# Patient Record
Sex: FEMALE | Race: WHITE | NOT HISPANIC OR LATINO | URBAN - METROPOLITAN AREA
[De-identification: names, ages, dates, MRNs, and addresses within clinical notes are randomized per-mention and may not be internally consistent; named-entity substitution may affect disease eponyms.]

---

## 2017-06-01 ENCOUNTER — EMERGENCY (EMERGENCY)
Facility: HOSPITAL | Age: 57
LOS: 0 days | Discharge: HOME | End: 2017-06-01
Attending: EMERGENCY MEDICINE | Admitting: EMERGENCY MEDICINE
Payer: COMMERCIAL

## 2017-06-01 VITALS
RESPIRATION RATE: 16 BRPM | SYSTOLIC BLOOD PRESSURE: 151 MMHG | DIASTOLIC BLOOD PRESSURE: 78 MMHG | TEMPERATURE: 99 F | HEART RATE: 65 BPM | OXYGEN SATURATION: 100 %

## 2017-06-01 VITALS — WEIGHT: 130.07 LBS | HEIGHT: 61 IN
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
## 2017-06-01 DIAGNOSIS — S09.90XA UNSPECIFIED INJURY OF HEAD, INITIAL ENCOUNTER: ICD-10-CM

## 2017-06-01 PROCEDURE — 70450 CT HEAD/BRAIN W/O DYE: CPT | Mod: 26

## 2017-06-01 PROCEDURE — 99284 EMERGENCY DEPT VISIT MOD MDM: CPT

## 2017-06-01 PROCEDURE — 72125 CT NECK SPINE W/O DYE: CPT | Mod: 26

## 2017-06-01 RX ORDER — ACETAMINOPHEN 500 MG
1000 TABLET ORAL ONCE
Qty: 0 | Refills: 0 | Status: COMPLETED | OUTPATIENT
Start: 2017-06-01 | End: 2017-06-01

## 2017-06-01 RX ADMIN — Medication 1000 MILLIGRAM(S): at 03:04

## 2017-06-01 NOTE — ED ADULT NURSE NOTE - OBJECTIVE STATEMENT
Pt presents to ED s/p slip and fall in the kitchen - pt states she hit her head on the corner of the counter. Neg LOC. Pt c/o mild pain 1/10 in her neck and right parietal side of her head. No abrasions or lacerations noted. Pt denies visual disturbances. Pt is alert and oriented x 4. States she takes a baby aspirin every day but did not take one today.

## 2017-06-01 NOTE — ED PROVIDER NOTE - NS ED MD SCRIBE ATTENDING SCRIBE SECTIONS
VITAL SIGNS( Pullset)/HISTORY OF PRESENT ILLNESS/PHYSICAL EXAM/REVIEW OF SYSTEMS/RESULTS/DISPOSITION/PAST MEDICAL/SURGICAL/SOCIAL HISTORY/PROGRESS NOTE

## 2017-06-01 NOTE — ED ADULT NURSE NOTE - CHIEF COMPLAINT QUOTE
pt presents to ED s/p head injury. pt states she slipped on water and struck head on wall. neg LOC. denies any dizziness, chest pain, sob, n/v/d.

## 2017-06-01 NOTE — ED PROVIDER NOTE - DETAILS:
Cinthia Aggarwal MD - The scribe's documentation has been prepared under my direction and personally reviewed by me in its entirety. I confirm that the note above accurately reflects all work, treatment, procedures, and medical decision making performed by me.

## 2017-06-01 NOTE — ED PROVIDER NOTE - OBJECTIVE STATEMENT
57 y/o female with PMHx of HLD, on Statin and baby ASA presents to the ED s/p fall. Pt states she slipped on water in her kitchen and hit the left side of her head against the wall. C/o left sided neck pain and HA. Denies LOC and any other pain. Came to ED because she was afraid she would die in her sleep.

## 2017-06-01 NOTE — ED ADULT TRIAGE NOTE - CHIEF COMPLAINT QUOTE
pt presents to ED s/p head injury. pt states she slipped on water and struck head on wall. neg LOC. denies any dizziness, chest pain, sob, n/v/d. pt presents to ED s/p fall and head injury. pt states she slipped on water and struck head on wall. neg LOC. denies any dizziness, chest pain, sob, n/v/d.

## 2021-07-01 NOTE — ED ADULT NURSE NOTE - CAS EDN DISCHARGE ASSESSMENT
PATIENT: Vandana Ramírez   DATE OF SERVICE: 2021   : 1969 MRN: [id number]       Chief Complaint   Patient presents with   • Injury     patient had an head injury on  at work. Patient was hit in the head with a piece of wood on the right temple. Patient was evaulated by the PCP provided by the employer-diagnosed wiht contusion. Patient endorsing tingiling sensation along her right temple and right fore head. Paitent states that she began to have blurry vision in her right eye about 2 days.        Vandana is a female presents to the [facility] for        patient had an head injury on  at work. Denies LOC. Patient was hit in the head with a piece of wood on the right temple. Patient was evaulated by the PCP provided by the employer-diagnosed wiht contusion. Patient endorsing tingiling sensation along her right temple and right fore head. Paitent states that she began to have blurry vision in her right eye about 2 days. Denies f/c/n/v, cp/sob, numbness, extremity weakness, confusion, dizziness        MEDICATIONS:  Current Outpatient Medications   Medication Sig   • losartan-hydrochlorothiazide (HYZAAR) 50-12.5 MG per tablet Take 1 tablet by mouth daily.   • Cyanocobalamin (VITAMIN B 12 PO)    • Nutritional Supplements (VITAMIN D MAINTENANCE PO)    • aspirin 81 MG tablet Take 81 mg by mouth daily.   • azithromycin (Zithromax Z-Yovani) 250 MG tablet 2 tablets day one, then 1 tablet days 2-5   • hydrochlorothiazide (HYDRODIURIL) 12.5 MG tablet      No current facility-administered medications for this visit.         ALLERGIES:  ALLERGIES:   Allergen Reactions   • Ace Inhibitors Angioedema   • Lisinopril HIVES       ACTIVE PROBLEM LIST:  Patient Active Problem List   Diagnosis   • Strep tonsillitis   • Essential hypertension   • Obesity (BMI 30-39.9)   • Acute URI         PAST MEDICAL HISTORY:  Past Medical History:   Diagnosis Date   • Essential (primary) hypertension    • No known problems        PAST  SURGICAL HISTORY:  Past Surgical History:   Procedure Laterality Date   • Hysterectomy     • Kidney surgery         FAMILY HISTORY:  Family History   Problem Relation Age of Onset   • Patient is unaware of any medical problems Mother    • Patient is unaware of any medical problems Father        SOCIAL HISTORY:  Social History     Tobacco Use   • Smoking status: Current Every Day Smoker   • Smokeless tobacco: Never Used   Substance Use Topics   • Alcohol use: Not on file   • Drug use: Not on file         Patient's medications, allergies, immunizations, past medical, surgical, and social history  were reviewed and updated as appropriate.      Review of Systems   Constitutional: Negative.    HENT: Negative.    Eyes: Positive for photophobia and visual disturbance.   Respiratory: Negative.    Cardiovascular: Negative.    Gastrointestinal: Negative.    Genitourinary: Negative.    Musculoskeletal: Negative.    Skin: Negative.    Neurological: Positive for headaches.   Psychiatric/Behavioral: Negative.      12 pt ROS performed. All systems reviewed and neg except HPI. Refer to HPI.      Objective   Visit Vitals  BP (!) 153/96 (BP Location: RUE - Right upper extremity, Patient Position: Sitting, Cuff Size: Regular)   Pulse 96   Temp 98.5 °F (36.9 °C) (Oral)   Resp 18   Wt 81.2 kg (179 lb)   SpO2 98%   BMI 28.89 kg/m²     Physical Exam  Vitals and nursing note reviewed.   Constitutional:       General: She is awake. She is not in acute distress.     Appearance: Normal appearance. She is not toxic-appearing or diaphoretic.   HENT:      Head: Normocephalic and atraumatic. No raccoon eyes, Jones's sign, abrasion, contusion, masses, right periorbital erythema, left periorbital erythema or laceration.      Jaw: There is normal jaw occlusion.      Right Ear: Tympanic membrane, ear canal and external ear normal.      Left Ear: Tympanic membrane, ear canal and external ear normal.      Nose: Nose normal.      Mouth/Throat:       Lips: Pink.      Mouth: Mucous membranes are moist.      Pharynx: Oropharynx is clear.   Eyes:      Extraocular Movements: Extraocular movements intact.      Conjunctiva/sclera: Conjunctivae normal.      Pupils: Pupils are equal, round, and reactive to light.   Cardiovascular:      Rate and Rhythm: Normal rate and regular rhythm.      Pulses: Normal pulses.      Heart sounds: Normal heart sounds.   Pulmonary:      Effort: Pulmonary effort is normal.      Breath sounds: Normal breath sounds and air entry.   Musculoskeletal:         General: Normal range of motion.      Cervical back: Normal range of motion and neck supple.   Skin:     General: Skin is warm and dry.      Capillary Refill: Capillary refill takes less than 2 seconds.      Coloration: Skin is not pale (pink).   Neurological:      General: No focal deficit present.      Mental Status: She is alert and oriented to person, place, and time.      Cranial Nerves: Cranial nerves are intact.      Motor: Motor function is intact. No weakness (BUE/BLE strength and b/l hand  5/5).      Coordination: Coordination is intact.      Gait: Gait is intact.   Psychiatric:         Behavior: Behavior normal. Behavior is cooperative.         Vitals:    07/01/21 1013 07/01/21 1018   BP: (!) 170/101 (!) 153/96   BP Location: LUE - Left upper extremity RUE - Right upper extremity   Patient Position: Sitting Sitting   Cuff Size: Regular Regular   Pulse: 96    Resp: 18    Temp: 98.5 °F (36.9 °C)    TempSrc: Oral    SpO2: 98%    Weight: 81.2 kg (179 lb)          Assessment   Problem List Items Addressed This Visit     None      Visit Diagnoses     Injury of head, initial encounter    -  Primary    Blurry vision        Tingling              Based on HPI complaints and exam findings pt sent to the ER for further evaluation. Pt states she agrees with plan and will go to [facility] ER immediately. Pt declined EMS. Pt will drive to the ER as per pt request, pt is stable w/ no acute  distress, vss.     Alert and oriented to person, place and time

## 2022-02-03 NOTE — ED PROVIDER NOTE - DISPOSITION TYPE
DISCHARGE Glycopyrrolate Pregnancy And Lactation Text: This medication is Pregnancy Category B and is considered safe during pregnancy. It is unknown if it is excreted breast milk.

## 2023-10-13 NOTE — ED ADULT NURSE NOTE - TEMPLATE LIST FOR HEAD TO TOE ASSESSMENT
Problem: Respiratory - Adult  Goal: Normal spontaneous ventilation  10/13/2023 1324 by Mono GARCIA RCP  Outcome: Progressing   Vent setting optimized to achieve target tidal volume, respiratory rate and ideal oxygen saturations. SBT will be performed when appropriate. Patient Weaning Progress    The patient's vent settings was not able to be weaned this shift. Ventilator settings that were weaned              [] Mode   [] Pressure support weaned   [] Fio2 weaned   [] Peep weaned      Spontaneous weaning trial  was not attempted. due to defined parameters for SBT (spontaneous breathing trial) not being met. Reason that defined ventilator parameters for SBT was not met              [] Patient condition requires increased ventilator settings  [x] Requires increased sedation   [] Settings not within weaning range   [] SAT not completed   [x] Physician orders      Cuff was  deflated to determine cuff leak. A leak  was not detected. Unable to get agreement for goals because no family is present and patient cannot respond. Fall

## 2024-04-30 NOTE — ED ADULT NURSE NOTE - NS ED NOTE ABUSE RESPONSE YN
Include Location In Plan?: Yes Hide Include Location In Plan Question?: No Detail Level: Generalized Detail Level: Simple no

## 2024-07-01 ENCOUNTER — EMERGENCY (EMERGENCY)
Facility: HOSPITAL | Age: 64
LOS: 0 days | Discharge: HOME | End: 2024-07-01
Attending: STUDENT IN AN ORGANIZED HEALTH CARE EDUCATION/TRAINING PROGRAM
Payer: COMMERCIAL

## 2024-07-01 VITALS
OXYGEN SATURATION: 100 % | HEART RATE: 82 BPM | DIASTOLIC BLOOD PRESSURE: 82 MMHG | HEIGHT: 70 IN | TEMPERATURE: 99 F | WEIGHT: 131.18 LBS | RESPIRATION RATE: 18 BRPM | SYSTOLIC BLOOD PRESSURE: 141 MMHG

## 2024-07-01 VITALS
SYSTOLIC BLOOD PRESSURE: 142 MMHG | HEART RATE: 71 BPM | OXYGEN SATURATION: 100 % | RESPIRATION RATE: 18 BRPM | DIASTOLIC BLOOD PRESSURE: 82 MMHG

## 2024-07-01 DIAGNOSIS — M54.9 DORSALGIA, UNSPECIFIED: ICD-10-CM
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
## 2024-07-01 DIAGNOSIS — M79.89 OTHER SPECIFIED SOFT TISSUE DISORDERS: ICD-10-CM

## 2024-07-01 DIAGNOSIS — E78.5 HYPERLIPIDEMIA, UNSPECIFIED: ICD-10-CM

## 2024-07-01 DIAGNOSIS — S60.222A CONTUSION OF LEFT HAND, INITIAL ENCOUNTER: ICD-10-CM

## 2024-07-01 DIAGNOSIS — Z79.82 LONG TERM (CURRENT) USE OF ASPIRIN: ICD-10-CM
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
## 2024-07-01 DIAGNOSIS — M79.642 PAIN IN LEFT HAND: ICD-10-CM

## 2024-07-01 PROCEDURE — 99284 EMERGENCY DEPT VISIT MOD MDM: CPT | Mod: 25

## 2024-07-01 PROCEDURE — 73110 X-RAY EXAM OF WRIST: CPT | Mod: LT

## 2024-07-01 PROCEDURE — 73120 X-RAY EXAM OF HAND: CPT | Mod: 26,LT

## 2024-07-01 PROCEDURE — 73120 X-RAY EXAM OF HAND: CPT | Mod: LT

## 2024-07-01 PROCEDURE — 93010 ELECTROCARDIOGRAM REPORT: CPT

## 2024-07-01 PROCEDURE — 99284 EMERGENCY DEPT VISIT MOD MDM: CPT

## 2024-07-01 PROCEDURE — 93005 ELECTROCARDIOGRAM TRACING: CPT

## 2024-07-01 PROCEDURE — 73110 X-RAY EXAM OF WRIST: CPT | Mod: 26,LT

## 2024-07-01 RX ADMIN — Medication 600 MILLIGRAM(S): at 17:34

## 2024-07-03 PROBLEM — Z00.00 ENCOUNTER FOR PREVENTIVE HEALTH EXAMINATION: Status: ACTIVE | Noted: 2024-07-03

## 2024-07-12 ENCOUNTER — APPOINTMENT (OUTPATIENT)
Dept: ORTHOPEDIC SURGERY | Facility: CLINIC | Age: 64
End: 2024-07-12
Payer: COMMERCIAL

## 2024-07-12 VITALS — BODY MASS INDEX: 24.29 KG/M2 | HEIGHT: 60.5 IN | WEIGHT: 127 LBS

## 2024-07-12 DIAGNOSIS — Z78.9 OTHER SPECIFIED HEALTH STATUS: ICD-10-CM

## 2024-07-12 DIAGNOSIS — S69.90XA UNSPECIFIED INJURY OF UNSPECIFIED WRIST, HAND AND FINGER(S), INITIAL ENCOUNTER: ICD-10-CM

## 2024-07-12 PROCEDURE — 99204 OFFICE O/P NEW MOD 45 MIN: CPT

## 2024-07-12 RX ORDER — ATORVASTATIN CALCIUM 10 MG/1
10 TABLET, FILM COATED ORAL
Refills: 0 | Status: ACTIVE | COMMUNITY

## 2024-07-12 RX ORDER — ASPIRIN 81 MG
81 TABLET, DELAYED RELEASE (ENTERIC COATED) ORAL
Refills: 0 | Status: ACTIVE | COMMUNITY

## 2024-07-13 PROBLEM — S69.90XA HAND INJURY: Status: ACTIVE | Noted: 2024-07-13

## 2024-08-02 ENCOUNTER — APPOINTMENT (OUTPATIENT)
Dept: ORTHOPEDIC SURGERY | Facility: CLINIC | Age: 64
End: 2024-08-02